# Patient Record
Sex: MALE | URBAN - METROPOLITAN AREA
[De-identification: names, ages, dates, MRNs, and addresses within clinical notes are randomized per-mention and may not be internally consistent; named-entity substitution may affect disease eponyms.]

---

## 2024-03-14 ENCOUNTER — OV NP (OUTPATIENT)
Dept: URBAN - METROPOLITAN AREA CLINIC 94 | Facility: CLINIC | Age: 74
End: 2024-03-14
Payer: MEDICARE

## 2024-03-14 VITALS
HEIGHT: 69 IN | SYSTOLIC BLOOD PRESSURE: 129 MMHG | OXYGEN SATURATION: 95 % | BODY MASS INDEX: 15.85 KG/M2 | HEART RATE: 82 BPM | TEMPERATURE: 97.3 F | WEIGHT: 107 LBS | DIASTOLIC BLOOD PRESSURE: 82 MMHG

## 2024-03-14 DIAGNOSIS — R19.7 DIARRHEA, UNSPECIFIED TYPE: ICD-10-CM

## 2024-03-14 DIAGNOSIS — R63.4 WEIGHT LOSS: ICD-10-CM

## 2024-03-14 PROCEDURE — 99203 OFFICE O/P NEW LOW 30 MIN: CPT | Performed by: PHYSICIAN ASSISTANT

## 2024-03-14 RX ORDER — CHOLESTYRAMINE 4 G/9G
1 SCOOP POWDER, FOR SUSPENSION ORAL ONCE A DAY
Status: ACTIVE | COMMUNITY

## 2024-03-14 RX ORDER — AMLODIPINE BESYLATE 10 MG/1
1 TABLET TABLET ORAL ONCE A DAY
Status: ACTIVE | COMMUNITY

## 2024-03-14 RX ORDER — FLUTICASONE FUROATE, UMECLIDINIUM BROMIDE AND VILANTEROL TRIFENATATE 100; 62.5; 25 UG/1; UG/1; UG/1
1 PUFF POWDER RESPIRATORY (INHALATION) ONCE A DAY
Status: ACTIVE | COMMUNITY

## 2024-03-14 RX ORDER — COLESEVELAM HYDROCHLORIDE 625 MG/1
3 TABLETS WITH MEALS TABLET, COATED ORAL TWICE A DAY
Qty: 180 | Refills: 3 | OUTPATIENT
Start: 2024-03-14

## 2024-03-14 RX ORDER — MAGNESIUM 200 MG
2 TABLETS WITH A MEAL TABLET ORAL ONCE A DAY
Status: ACTIVE | COMMUNITY

## 2024-03-14 RX ORDER — DICYCLOMINE HYDROCHLORIDE 20 MG/1
1 TABLET TABLET ORAL THREE TIMES A DAY
Status: ACTIVE | COMMUNITY

## 2024-03-14 RX ORDER — MEGESTROL ACETATE 40 MG/1
1 TABLET TABLET ORAL ONCE A DAY
Status: ACTIVE | COMMUNITY

## 2024-03-14 RX ORDER — PROMETHAZINE HYDROCHLORIDE 25 MG/1
1 TABLET AS NEEDED TABLET ORAL
Status: ACTIVE | COMMUNITY

## 2024-03-14 RX ORDER — OMEPRAZOLE 40 MG/1
1 CAPSULE 30 MINUTES BEFORE MORNING MEAL CAPSULE, DELAYED RELEASE ORAL ONCE A DAY
Status: ACTIVE | COMMUNITY

## 2024-03-14 RX ORDER — SERTRALINE 50 MG/1
1 TABLET TABLET, FILM COATED ORAL ONCE A DAY
Status: ACTIVE | COMMUNITY

## 2024-03-14 NOTE — HPI-TODAY'S VISIT:
75 yo M evaluated today for wt loss and chronic diarrhea. Patient accompanied by his family - spouse and daughter  Patient hx began with vague RLQ pain started in 2021- hx of CCY Since then stomach pain resolved. Of recent has developed chronic ,constant diarrhea with wt loss.  Total wt loss over last 2 yrs - 25 lbs.  Occasional N/V  May have 6- 7 urgent, tan colored, Denies bleeding or black stools. Denies fatty or oily stools. Stool is malodorous. Increased burping.    EGD 8/2022--esophagitis, grade 1 -hiatal hernia, 2 cm in size-stricture, located in the prepyloric region, dilated to a diameter of 18-20 mm using a through-the-scope dilating balloon -bx taken-seem like a post ulcer deformaty -- Normal dudonum EGD 2021-esophagitis, gastritis.  Negative biopsies  Colonoscopy 2021-negative biopsies for colitis, internal hemorrhoids.  Repeat 10 years  Colonoscopy 4/28/2014 hyperplastic polyp, hemorrhoids repeat in 7-10 years  CT 2023-  No evidence of acute gastrointestinal process.  PMH : prostate cancer, HTN and COPD

## 2024-03-18 LAB
C-REACTIVE PROTEIN, QUANT: 0.7
IMMUNOGLOBULIN A, QN, SERUM: 118
MAGNESIUM: 1.3
T-TRANSGLUTAMINASE (TTG) IGA: <1
VITAMIN A: 84

## 2024-04-11 ENCOUNTER — OV EP (OUTPATIENT)
Dept: URBAN - METROPOLITAN AREA CLINIC 94 | Facility: CLINIC | Age: 74
End: 2024-04-11

## 2024-04-11 RX ORDER — SERTRALINE 50 MG/1
1 TABLET TABLET, FILM COATED ORAL ONCE A DAY
Status: ACTIVE | COMMUNITY

## 2024-04-11 RX ORDER — MAGNESIUM 200 MG
2 TABLETS WITH A MEAL TABLET ORAL ONCE A DAY
Status: ACTIVE | COMMUNITY

## 2024-04-11 RX ORDER — AMLODIPINE BESYLATE 10 MG/1
1 TABLET TABLET ORAL ONCE A DAY
Status: ACTIVE | COMMUNITY

## 2024-04-11 RX ORDER — PROMETHAZINE HYDROCHLORIDE 25 MG/1
1 TABLET AS NEEDED TABLET ORAL
Status: ACTIVE | COMMUNITY

## 2024-04-11 RX ORDER — CHOLESTYRAMINE 4 G/9G
1 SCOOP POWDER, FOR SUSPENSION ORAL ONCE A DAY
Status: ACTIVE | COMMUNITY

## 2024-04-11 RX ORDER — DICYCLOMINE HYDROCHLORIDE 20 MG/1
1 TABLET TABLET ORAL THREE TIMES A DAY
Status: ACTIVE | COMMUNITY

## 2024-04-11 RX ORDER — MEGESTROL ACETATE 40 MG/1
1 TABLET TABLET ORAL ONCE A DAY
Status: ACTIVE | COMMUNITY

## 2024-04-11 RX ORDER — COLESEVELAM HYDROCHLORIDE 625 MG/1
3 TABLETS WITH MEALS TABLET, COATED ORAL TWICE A DAY
Qty: 180 | Refills: 3 | Status: ACTIVE | COMMUNITY
Start: 2024-03-14

## 2024-04-11 RX ORDER — OMEPRAZOLE 40 MG/1
1 CAPSULE 30 MINUTES BEFORE MORNING MEAL CAPSULE, DELAYED RELEASE ORAL ONCE A DAY
Status: ACTIVE | COMMUNITY

## 2024-04-11 RX ORDER — FLUTICASONE FUROATE, UMECLIDINIUM BROMIDE AND VILANTEROL TRIFENATATE 100; 62.5; 25 UG/1; UG/1; UG/1
1 PUFF POWDER RESPIRATORY (INHALATION) ONCE A DAY
Status: ACTIVE | COMMUNITY